# Patient Record
Sex: FEMALE | Race: BLACK OR AFRICAN AMERICAN | NOT HISPANIC OR LATINO | Employment: FULL TIME | URBAN - METROPOLITAN AREA
[De-identification: names, ages, dates, MRNs, and addresses within clinical notes are randomized per-mention and may not be internally consistent; named-entity substitution may affect disease eponyms.]

---

## 2017-01-06 ENCOUNTER — ALLSCRIPTS OFFICE VISIT (OUTPATIENT)
Dept: OTHER | Facility: OTHER | Age: 52
End: 2017-01-06

## 2017-01-09 ENCOUNTER — ALLSCRIPTS OFFICE VISIT (OUTPATIENT)
Dept: OTHER | Facility: OTHER | Age: 52
End: 2017-01-09

## 2017-03-22 ENCOUNTER — GENERIC CONVERSION - ENCOUNTER (OUTPATIENT)
Dept: OTHER | Facility: OTHER | Age: 52
End: 2017-03-22

## 2017-03-24 ENCOUNTER — ALLSCRIPTS OFFICE VISIT (OUTPATIENT)
Dept: OTHER | Facility: OTHER | Age: 52
End: 2017-03-24

## 2017-03-24 DIAGNOSIS — I10 ESSENTIAL (PRIMARY) HYPERTENSION: ICD-10-CM

## 2017-03-24 DIAGNOSIS — M54.9 DORSALGIA: ICD-10-CM

## 2017-03-24 DIAGNOSIS — Z82.3 FAMILY HISTORY OF STROKE: ICD-10-CM

## 2017-03-31 ENCOUNTER — LAB CONVERSION - ENCOUNTER (OUTPATIENT)
Dept: OTHER | Facility: OTHER | Age: 52
End: 2017-03-31

## 2017-03-31 ENCOUNTER — ALLSCRIPTS OFFICE VISIT (OUTPATIENT)
Dept: OTHER | Facility: OTHER | Age: 52
End: 2017-03-31

## 2017-03-31 LAB
AMPHETAMINES (HISTORICAL): NORMAL
BARBITUATES (HISTORICAL): NEGATIVE
BENZODIAZEPINES (HISTORICAL): POSITIVE
COCAINE (METABOLITE) (HISTORICAL): NEGATIVE
CONTACT: (HISTORICAL): NORMAL
Lab: NEGATIVE
Lab: NEGATIVE
METHADONE (HISTORICAL): NEGATIVE
METHAQUALONE (HISTORICAL): NEGATIVE
OPIATES (HISTORICAL): NEGATIVE
PLEASE NOTE: (HISTORICAL): ABNORMAL
PROPOXYPHENE (HISTORICAL): NEGATIVE
TEST INFORMATION (HISTORICAL): NORMAL
TEST NAME (HISTORICAL): NORMAL

## 2017-04-10 ENCOUNTER — HOSPITAL ENCOUNTER (OUTPATIENT)
Dept: NON INVASIVE DIAGNOSTICS | Facility: HOSPITAL | Age: 52
Discharge: HOME | End: 2017-04-10
Payer: COMMERCIAL

## 2017-04-10 ENCOUNTER — HOSPITAL ENCOUNTER (OUTPATIENT)
Dept: NUCLEAR MEDICINE | Facility: HOSPITAL | Age: 52
Discharge: HOME | End: 2017-04-10
Payer: COMMERCIAL

## 2017-04-10 DIAGNOSIS — M54.9 DORSALGIA: ICD-10-CM

## 2017-04-10 DIAGNOSIS — Z82.3 FAMILY HISTORY OF STROKE: ICD-10-CM

## 2017-04-10 DIAGNOSIS — I10 ESSENTIAL (PRIMARY) HYPERTENSION: ICD-10-CM

## 2017-04-10 PROCEDURE — 78452 HT MUSCLE IMAGE SPECT MULT: CPT

## 2017-04-10 PROCEDURE — 93017 CV STRESS TEST TRACING ONLY: CPT

## 2017-04-10 PROCEDURE — A9502 TC99M TETROFOSMIN: HCPCS

## 2017-04-10 RX ADMIN — REGADENOSON 0.4 MG: 0.08 INJECTION, SOLUTION INTRAVENOUS at 10:12

## 2017-05-08 ENCOUNTER — ALLSCRIPTS OFFICE VISIT (OUTPATIENT)
Dept: OTHER | Facility: OTHER | Age: 52
End: 2017-05-08

## 2018-01-12 VITALS
HEART RATE: 98 BPM | HEIGHT: 62 IN | WEIGHT: 202.5 LBS | DIASTOLIC BLOOD PRESSURE: 90 MMHG | BODY MASS INDEX: 37.26 KG/M2 | OXYGEN SATURATION: 98 % | SYSTOLIC BLOOD PRESSURE: 134 MMHG

## 2018-01-12 VITALS
HEIGHT: 62 IN | OXYGEN SATURATION: 98 % | WEIGHT: 200.5 LBS | BODY MASS INDEX: 36.9 KG/M2 | HEART RATE: 80 BPM | SYSTOLIC BLOOD PRESSURE: 150 MMHG | DIASTOLIC BLOOD PRESSURE: 100 MMHG

## 2018-01-14 VITALS
WEIGHT: 204.13 LBS | BODY MASS INDEX: 37.56 KG/M2 | DIASTOLIC BLOOD PRESSURE: 100 MMHG | HEART RATE: 66 BPM | SYSTOLIC BLOOD PRESSURE: 144 MMHG | OXYGEN SATURATION: 98 % | HEIGHT: 62 IN

## 2018-01-15 VITALS
TEMPERATURE: 98 F | SYSTOLIC BLOOD PRESSURE: 130 MMHG | DIASTOLIC BLOOD PRESSURE: 100 MMHG | HEART RATE: 77 BPM | BODY MASS INDEX: 37.26 KG/M2 | OXYGEN SATURATION: 97 % | HEIGHT: 62 IN | WEIGHT: 202.5 LBS

## 2020-04-01 ENCOUNTER — TELEPHONE (OUTPATIENT)
Dept: FAMILY MEDICINE CLINIC | Facility: CLINIC | Age: 55
End: 2020-04-01

## 2020-04-06 ENCOUNTER — TELEPHONE (OUTPATIENT)
Dept: OTHER | Facility: OTHER | Age: 55
End: 2020-04-06

## 2020-04-06 ENCOUNTER — NURSE TRIAGE (OUTPATIENT)
Dept: OTHER | Facility: OTHER | Age: 55
End: 2020-04-06

## 2020-04-06 DIAGNOSIS — Z20.828 EXPOSURE TO SARS-ASSOCIATED CORONAVIRUS: Primary | ICD-10-CM

## 2020-04-07 ENCOUNTER — TELEPHONE (OUTPATIENT)
Dept: FAMILY MEDICINE CLINIC | Facility: CLINIC | Age: 55
End: 2020-04-07

## 2020-04-07 DIAGNOSIS — Z20.828 EXPOSURE TO SARS-ASSOCIATED CORONAVIRUS: ICD-10-CM

## 2020-04-07 PROCEDURE — 87635 SARS-COV-2 COVID-19 AMP PRB: CPT

## 2020-04-08 ENCOUNTER — TELEPHONE (OUTPATIENT)
Dept: INTERNAL MEDICINE CLINIC | Facility: CLINIC | Age: 55
End: 2020-04-08

## 2020-04-08 LAB — SARS-COV-2 RNA SPEC QL NAA+PROBE: NOT DETECTED

## 2020-04-09 ENCOUNTER — TELEMEDICINE (OUTPATIENT)
Dept: FAMILY MEDICINE CLINIC | Facility: CLINIC | Age: 55
End: 2020-04-09
Payer: COMMERCIAL

## 2020-04-09 ENCOUNTER — TELEPHONE (OUTPATIENT)
Dept: FAMILY MEDICINE CLINIC | Facility: CLINIC | Age: 55
End: 2020-04-09

## 2020-04-09 DIAGNOSIS — J45.40 MODERATE PERSISTENT ASTHMA WITHOUT COMPLICATION: ICD-10-CM

## 2020-04-09 DIAGNOSIS — J30.2 SEASONAL ALLERGIES: ICD-10-CM

## 2020-04-09 DIAGNOSIS — I10 HYPERTENSION, UNCONTROLLED: Primary | ICD-10-CM

## 2020-04-09 PROCEDURE — 99214 OFFICE O/P EST MOD 30 MIN: CPT | Performed by: INTERNAL MEDICINE

## 2020-04-09 RX ORDER — ALBUTEROL SULFATE 90 UG/1
2 AEROSOL, METERED RESPIRATORY (INHALATION) EVERY 6 HOURS PRN
Qty: 1 INHALER | Refills: 5 | Status: SHIPPED | OUTPATIENT
Start: 2020-04-09

## 2020-04-09 RX ORDER — BUDESONIDE AND FORMOTEROL FUMARATE DIHYDRATE 160; 4.5 UG/1; UG/1
2 AEROSOL RESPIRATORY (INHALATION) 2 TIMES DAILY
Qty: 1 INHALER | Refills: 1 | Status: SHIPPED | OUTPATIENT
Start: 2020-04-09

## 2020-04-09 RX ORDER — LORATADINE 10 MG/1
10 TABLET ORAL DAILY
Qty: 90 TABLET | Refills: 1 | Status: SHIPPED | OUTPATIENT
Start: 2020-04-09

## 2020-04-09 RX ORDER — LOSARTAN POTASSIUM 100 MG/1
100 TABLET ORAL DAILY
Qty: 30 TABLET | Refills: 3 | Status: SHIPPED | OUTPATIENT
Start: 2020-04-09

## 2020-04-13 ENCOUNTER — TELEMEDICINE (OUTPATIENT)
Dept: FAMILY MEDICINE CLINIC | Facility: CLINIC | Age: 55
End: 2020-04-13
Payer: COMMERCIAL

## 2020-04-13 DIAGNOSIS — J45.40 MODERATE PERSISTENT ASTHMA WITHOUT COMPLICATION: ICD-10-CM

## 2020-04-13 DIAGNOSIS — Z13.1 SCREENING FOR DIABETES MELLITUS: ICD-10-CM

## 2020-04-13 DIAGNOSIS — Z13.21 ENCOUNTER FOR VITAMIN DEFICIENCY SCREENING: ICD-10-CM

## 2020-04-13 DIAGNOSIS — J30.2 SEASONAL ALLERGIES: ICD-10-CM

## 2020-04-13 DIAGNOSIS — Z13.29 SCREENING FOR THYROID DISORDER: ICD-10-CM

## 2020-04-13 DIAGNOSIS — Z13.220 NEED FOR LIPID SCREENING: ICD-10-CM

## 2020-04-13 DIAGNOSIS — Z13.89 SCREENING FOR GENITOURINARY CONDITION: ICD-10-CM

## 2020-04-13 DIAGNOSIS — I10 HYPERTENSION, UNCONTROLLED: ICD-10-CM

## 2020-04-13 PROCEDURE — 99213 OFFICE O/P EST LOW 20 MIN: CPT | Performed by: INTERNAL MEDICINE

## 2020-04-13 RX ORDER — HYDROCHLOROTHIAZIDE 25 MG/1
25 TABLET ORAL DAILY
Qty: 90 TABLET | Refills: 1 | Status: SHIPPED | OUTPATIENT
Start: 2020-04-13 | End: 2020-10-06

## 2020-04-14 ENCOUNTER — APPOINTMENT (OUTPATIENT)
Dept: LAB | Facility: MEDICAL CENTER | Age: 55
End: 2020-04-14
Payer: COMMERCIAL

## 2020-04-14 LAB
25(OH)D3 SERPL-MCNC: 19.6 NG/ML (ref 30–100)
ALBUMIN SERPL BCP-MCNC: 3.4 G/DL (ref 3.5–5)
ALP SERPL-CCNC: 82 U/L (ref 46–116)
ALT SERPL W P-5'-P-CCNC: 19 U/L (ref 12–78)
ANION GAP SERPL CALCULATED.3IONS-SCNC: 4 MMOL/L (ref 4–13)
AST SERPL W P-5'-P-CCNC: 8 U/L (ref 5–45)
BACTERIA UR QL AUTO: ABNORMAL /HPF
BASOPHILS # BLD AUTO: 0.05 THOUSANDS/ΜL (ref 0–0.1)
BASOPHILS NFR BLD AUTO: 1 % (ref 0–1)
BILIRUB SERPL-MCNC: 0.61 MG/DL (ref 0.2–1)
BILIRUB UR QL STRIP: NEGATIVE
BUN SERPL-MCNC: 12 MG/DL (ref 5–25)
CALCIUM SERPL-MCNC: 8.8 MG/DL (ref 8.3–10.1)
CHLORIDE SERPL-SCNC: 108 MMOL/L (ref 100–108)
CHOLEST SERPL-MCNC: 187 MG/DL (ref 50–200)
CLARITY UR: ABNORMAL
CO2 SERPL-SCNC: 30 MMOL/L (ref 21–32)
COLOR UR: YELLOW
CREAT SERPL-MCNC: 0.89 MG/DL (ref 0.6–1.3)
EOSINOPHIL # BLD AUTO: 0.11 THOUSAND/ΜL (ref 0–0.61)
EOSINOPHIL NFR BLD AUTO: 2 % (ref 0–6)
ERYTHROCYTE [DISTWIDTH] IN BLOOD BY AUTOMATED COUNT: 12.5 % (ref 11.6–15.1)
GFR SERPL CREATININE-BSD FRML MDRD: 84 ML/MIN/1.73SQ M
GLUCOSE P FAST SERPL-MCNC: 87 MG/DL (ref 65–99)
GLUCOSE UR STRIP-MCNC: NEGATIVE MG/DL
HCT VFR BLD AUTO: 43.2 % (ref 34.8–46.1)
HDLC SERPL-MCNC: 40 MG/DL
HGB BLD-MCNC: 13.7 G/DL (ref 11.5–15.4)
HGB UR QL STRIP.AUTO: NEGATIVE
IMM GRANULOCYTES # BLD AUTO: 0.01 THOUSAND/UL (ref 0–0.2)
IMM GRANULOCYTES NFR BLD AUTO: 0 % (ref 0–2)
KETONES UR STRIP-MCNC: NEGATIVE MG/DL
LDLC SERPL CALC-MCNC: 127 MG/DL (ref 0–100)
LEUKOCYTE ESTERASE UR QL STRIP: ABNORMAL
LYMPHOCYTES # BLD AUTO: 1.45 THOUSANDS/ΜL (ref 0.6–4.47)
LYMPHOCYTES NFR BLD AUTO: 28 % (ref 14–44)
MCH RBC QN AUTO: 28 PG (ref 26.8–34.3)
MCHC RBC AUTO-ENTMCNC: 31.7 G/DL (ref 31.4–37.4)
MCV RBC AUTO: 88 FL (ref 82–98)
MONOCYTES # BLD AUTO: 0.38 THOUSAND/ΜL (ref 0.17–1.22)
MONOCYTES NFR BLD AUTO: 7 % (ref 4–12)
NEUTROPHILS # BLD AUTO: 3.27 THOUSANDS/ΜL (ref 1.85–7.62)
NEUTS SEG NFR BLD AUTO: 62 % (ref 43–75)
NITRITE UR QL STRIP: NEGATIVE
NON-SQ EPI CELLS URNS QL MICRO: ABNORMAL /HPF
NONHDLC SERPL-MCNC: 147 MG/DL
NRBC BLD AUTO-RTO: 0 /100 WBCS
PH UR STRIP.AUTO: 5.5 [PH]
PLATELET # BLD AUTO: 257 THOUSANDS/UL (ref 149–390)
PMV BLD AUTO: 10 FL (ref 8.9–12.7)
POTASSIUM SERPL-SCNC: 3.6 MMOL/L (ref 3.5–5.3)
PROT SERPL-MCNC: 7.9 G/DL (ref 6.4–8.2)
PROT UR STRIP-MCNC: NEGATIVE MG/DL
RBC # BLD AUTO: 4.89 MILLION/UL (ref 3.81–5.12)
RBC #/AREA URNS AUTO: ABNORMAL /HPF
SODIUM SERPL-SCNC: 142 MMOL/L (ref 136–145)
SP GR UR STRIP.AUTO: 1.02 (ref 1–1.03)
TRIGL SERPL-MCNC: 99 MG/DL
TSH SERPL DL<=0.05 MIU/L-ACNC: 2.87 UIU/ML (ref 0.36–3.74)
UROBILINOGEN UR QL STRIP.AUTO: 0.2 E.U./DL
WBC # BLD AUTO: 5.27 THOUSAND/UL (ref 4.31–10.16)
WBC #/AREA URNS AUTO: ABNORMAL /HPF

## 2020-04-14 PROCEDURE — 80061 LIPID PANEL: CPT | Performed by: INTERNAL MEDICINE

## 2020-04-14 PROCEDURE — 80053 COMPREHEN METABOLIC PANEL: CPT | Performed by: INTERNAL MEDICINE

## 2020-04-14 PROCEDURE — 82306 VITAMIN D 25 HYDROXY: CPT | Performed by: INTERNAL MEDICINE

## 2020-04-14 PROCEDURE — 36415 COLL VENOUS BLD VENIPUNCTURE: CPT | Performed by: INTERNAL MEDICINE

## 2020-04-14 PROCEDURE — 84443 ASSAY THYROID STIM HORMONE: CPT | Performed by: INTERNAL MEDICINE

## 2020-04-14 PROCEDURE — 85025 COMPLETE CBC W/AUTO DIFF WBC: CPT | Performed by: INTERNAL MEDICINE

## 2020-04-14 PROCEDURE — 81001 URINALYSIS AUTO W/SCOPE: CPT | Performed by: INTERNAL MEDICINE

## 2020-05-21 ENCOUNTER — TELEPHONE (OUTPATIENT)
Dept: FAMILY MEDICINE CLINIC | Facility: CLINIC | Age: 55
End: 2020-05-21

## 2020-10-06 DIAGNOSIS — I10 HYPERTENSION, UNCONTROLLED: ICD-10-CM

## 2020-10-06 RX ORDER — HYDROCHLOROTHIAZIDE 25 MG/1
TABLET ORAL
Qty: 90 TABLET | Refills: 1 | Status: SHIPPED | OUTPATIENT
Start: 2020-10-06

## 2021-06-18 ENCOUNTER — VBI (OUTPATIENT)
Dept: ADMINISTRATIVE | Facility: OTHER | Age: 56
End: 2021-06-18

## 2021-12-21 ENCOUNTER — OFFICE VISIT (OUTPATIENT)
Dept: URGENT CARE | Age: 56
End: 2021-12-21
Payer: COMMERCIAL

## 2021-12-21 VITALS
SYSTOLIC BLOOD PRESSURE: 138 MMHG | DIASTOLIC BLOOD PRESSURE: 78 MMHG | RESPIRATION RATE: 18 BRPM | HEART RATE: 64 BPM | TEMPERATURE: 99.2 F | OXYGEN SATURATION: 96 %

## 2021-12-21 DIAGNOSIS — Z20.822 ENCOUNTER FOR LABORATORY TESTING FOR COVID-19 VIRUS: ICD-10-CM

## 2021-12-21 DIAGNOSIS — R10.9 ABDOMINAL PAIN, UNSPECIFIED ABDOMINAL LOCATION: ICD-10-CM

## 2021-12-21 DIAGNOSIS — B34.9 VIRAL SYNDROME: Primary | ICD-10-CM

## 2021-12-21 DIAGNOSIS — N39.0 URINARY TRACT INFECTION WITHOUT HEMATURIA, SITE UNSPECIFIED: ICD-10-CM

## 2021-12-21 LAB
SL AMB  POCT GLUCOSE, UA: NORMAL
SL AMB LEUKOCYTE ESTERASE,UA: NORMAL
SL AMB POCT BILIRUBIN,UA: NORMAL
SL AMB POCT BLOOD,UA: NORMAL
SL AMB POCT CLARITY,UA: CLEAR
SL AMB POCT COLOR,UA: NORMAL
SL AMB POCT KETONES,UA: NORMAL
SL AMB POCT NITRITE,UA: NORMAL
SL AMB POCT PH,UA: 5
SL AMB POCT SPECIFIC GRAVITY,UA: 1.03
SL AMB POCT URINE PROTEIN: 30
SL AMB POCT UROBILINOGEN: 0.2

## 2021-12-21 PROCEDURE — 87636 SARSCOV2 & INF A&B AMP PRB: CPT | Performed by: PHYSICIAN ASSISTANT

## 2021-12-21 PROCEDURE — 87086 URINE CULTURE/COLONY COUNT: CPT | Performed by: PHYSICIAN ASSISTANT

## 2021-12-21 PROCEDURE — 99213 OFFICE O/P EST LOW 20 MIN: CPT | Performed by: PHYSICIAN ASSISTANT

## 2021-12-21 PROCEDURE — 87147 CULTURE TYPE IMMUNOLOGIC: CPT | Performed by: PHYSICIAN ASSISTANT

## 2021-12-21 RX ORDER — AMLODIPINE BESYLATE 10 MG/1
TABLET ORAL
COMMUNITY
Start: 2021-12-11

## 2021-12-21 RX ORDER — CEPHALEXIN 500 MG/1
500 CAPSULE ORAL EVERY 8 HOURS SCHEDULED
Qty: 30 CAPSULE | Refills: 0 | Status: SHIPPED | OUTPATIENT
Start: 2021-12-21 | End: 2021-12-31

## 2021-12-23 LAB — BACTERIA UR CULT: NORMAL

## 2021-12-24 LAB
FLUAV RNA RESP QL NAA+PROBE: NEGATIVE
FLUBV RNA RESP QL NAA+PROBE: NEGATIVE
SARS-COV-2 RNA RESP QL NAA+PROBE: NEGATIVE

## 2022-02-28 ENCOUNTER — OFFICE VISIT (OUTPATIENT)
Dept: URGENT CARE | Age: 57
End: 2022-02-28
Payer: COMMERCIAL

## 2022-02-28 VITALS
TEMPERATURE: 96.6 F | OXYGEN SATURATION: 98 % | HEART RATE: 58 BPM | BODY MASS INDEX: 39.23 KG/M2 | SYSTOLIC BLOOD PRESSURE: 158 MMHG | DIASTOLIC BLOOD PRESSURE: 88 MMHG | RESPIRATION RATE: 20 BRPM | WEIGHT: 213.2 LBS | HEIGHT: 62 IN

## 2022-02-28 DIAGNOSIS — S61.210A LACERATION OF RIGHT INDEX FINGER WITHOUT FOREIGN BODY WITHOUT DAMAGE TO NAIL, INITIAL ENCOUNTER: Primary | ICD-10-CM

## 2022-02-28 PROCEDURE — 12002 RPR S/N/AX/GEN/TRNK2.6-7.5CM: CPT | Performed by: PHYSICIAN ASSISTANT

## 2022-02-28 PROCEDURE — 99213 OFFICE O/P EST LOW 20 MIN: CPT | Performed by: PHYSICIAN ASSISTANT

## 2022-02-28 RX ORDER — CEPHALEXIN 500 MG/1
500 CAPSULE ORAL EVERY 12 HOURS SCHEDULED
Qty: 14 CAPSULE | Refills: 0 | Status: SHIPPED | OUTPATIENT
Start: 2022-02-28 | End: 2022-03-07

## 2022-02-28 NOTE — PROGRESS NOTES
[facility]        NAME: Millie Mobley is a 60 y o  female  : 1965    MRN: [id number]  DATE: 2022  TIME: 2:06 PM    Assessment and Plan   Laceration of right index finger without foreign body without damage to nail, initial encounter [S61 210A]  1  Laceration of right index finger without foreign body without damage to nail, initial encounter  cephalexin (KEFLEX) 500 mg capsule   Patient presents with a laceration on the right index finger that requires repair as it is over an interphalangeal joint which is a high tension area  Laceration repaired as outlined below  Patient will be started on Keflex for infection prophylaxis  She is placed in a finger splint which she instructed to wear for the next 3-4 days and then she may remove it  We discussed wound care  We discussed signs of infection including erythema worsening pain drainage and inability to move the finger  Inability move finger would also be a sign of possible tendon damage  Patient should reports emergency room if she develops signs of infection or evidence of tendon damage  Patient will otherwise follow-up in the clinic in approximately 10 days for suture removal       Patient Instructions     Patient Instructions     Your laceration was repaired today  Leave dressing in place x 24 hours  You may need to soak your dressing in a saline solution or warm soapy water for a few minutes if stuck to the sutures to remove it  Keep wound clean and dry  Wash gently with soap and water as needed, do not submerge or soak as this will delay healing and increase risk of infection  Take antibiotics as directed until complete  Use over the counter ibuprofen and/or tylenol as directed on the bottle as needed for pain  Return to clinic in 7-10 days for suture removal or sooner if signs of infection develop (erythema, worsening pain, or thick white or yellow drainage)       Laceration   AMBULATORY CARE:   A laceration is an injury to the skin and the soft tissue underneath it  Lacerations can happen anywhere on the body  Common symptoms include the following:   · Injury or wound to skin and tissue of any shape size that looks like a cut, tear, or gash    · Edges of the wound may be close together or wide apart    · Pain, bleeding, bruising, or swelling    · Numbness around the wound    · Decreased movement in an area below the wound    Seek care immediately if:   · You have heavy bleeding or bleeding that does not stop after 10 minutes of holding firm, direct pressure over the wound  · Your wound opens up  Call your doctor if:   · You have a fever or chills  · Your laceration is red, warm, or swollen  · You have red streaks on your skin coming from your wound  · You have white or yellow drainage from the wound that smells bad  · You have pain that gets worse, even after treatment  · You have questions or concerns about your condition or care  Treatment for a laceration  includes care to stop any bleeding  Your healthcare provider will stop the bleeding by applying pressure to the wound  He or she may need to check your wound for foreign objects and clean it to decrease the chance of infection  Your laceration may be closed with stitches, staples, tissue glue, or medical strips  Ask your healthcare provider if you need a tetanus shot  Medicines: You may need any of the following:  · Prescription pain medicine  may be given  Ask your healthcare provider how to take this medicine safely  Some prescription pain medicines contain acetaminophen  Do not take other medicines that contain acetaminophen without talking to your healthcare provider  Too much acetaminophen may cause liver damage  Prescription pain medicine may cause constipation  Ask your healthcare provider how to prevent or treat constipation  · Antibiotics  help treat or prevent a bacterial infection  · Take your medicine as directed  Contact your healthcare provider if you think your medicine is not helping or if you have side effects  Tell him or her if you are allergic to any medicine  Keep a list of the medicines, vitamins, and herbs you take  Include the amounts, and when and why you take them  Bring the list or the pill bottles to follow-up visits  Carry your medicine list with you in case of an emergency  Care for your wound as directed:   · Do not get your wound wet  until your healthcare provider says it is okay  Do not soak your wound in water  Do not go swimming until your healthcare provider says it is okay  Carefully wash the wound with soap and water  Gently pat the area dry or allow it to air dry  · Change your bandages  when they get wet, dirty, or after washing  Apply new, clean bandages as directed  Do not apply elastic bandages or tape too tight  Do not put powders or lotions over your incision  · Apply antibiotic ointment as directed  Your healthcare provider may give you antibiotic ointment to put over your wound if you have stitches  If you have strips of tape over your incision, let them dry up and fall off on their own  If they do not fall off within 14 days, gently remove them  If you have glue over your wound, do not remove or pick at it  If your glue comes off, do not replace it with glue that you have at home  · Check your wound every day for signs of infection, such as swelling, redness, or pus  Self-care:   · Apply ice  on your wound for 15 to 20 minutes every hour or as directed  Use an ice pack, or put crushed ice in a plastic bag  Cover it with a towel  Ice helps prevent tissue damage and decreases swelling and pain  · Use a splint as directed  A splint will decrease movement and stress on your wound  It may help it heal faster  A splint may be used for lacerations over joints or areas of your body that bend  Ask your healthcare provider how to apply and remove a splint      · Decrease scarring of your wound  by applying ointments as directed  Do not apply ointments until your healthcare provider says it is okay  You may need to wait until your wound is healed  Ask which ointment to buy and how often to use it  After your wound is healed, use sunscreen over the area when you are out in the sun  You should do this for at least 6 months to 1 year after your injury  Follow up with your doctor as directed: You will need to return in 3 to 14 days to have stitches or staples removed  Write down your questions so you remember to ask them during your visits  © Copyright [address] Information is for End User's use only and may not be sold, redistributed or otherwise used for commercial purposes  All illustrations and images included in CareNotes® are the copyrighted property of A D A M , Inc  or [facility] Steffanie Chaves   The above information is an  only  It is not intended as medical advice for individual conditions or treatments  Talk to your doctor, nurse or pharmacist before following any medical regimen to see if it is safe and effective for you  Follow up with PCP in 3-5 days  Proceed to  ER if symptoms worsen  Chief Complaint     Chief Complaint   Patient presents with    Laceration     Pt was using a kitchen knife at home approx 30 minutes ago and cut her right third digit  Pt denies blood thinners  Last Tdap 4/2021  History of Present Illness       60year old female presents with laceration to the right index finger  Pt reports she was using a serrated knife and cut her finger approximately 30 minutes prior to check-in at our clinic  She is right handed  Pt denies numbness and reports she can use the digit normally, but the wound is very deep and she is concerned she may have damaged her tendon  She is not on any blood thinning medications  Pt reports that her last TDap was 04/2021      Review of Systems   Review of Systems   Skin: Positive for wound  Current Medications       Current Outpatient Medications:     albuterol (PROVENTIL HFA,VENTOLIN HFA) 90 mcg/act inhaler, Inhale 2 puffs every 6 (six) hours as needed for wheezing or shortness of breath (Patient not taking: Reported on 2/28/2022 ), Disp: 1 Inhaler, Rfl: 5    amLODIPine (NORVASC) 10 mg tablet, , Disp: , Rfl:     budesonide-formoterol (SYMBICORT) 160-4 5 mcg/act inhaler, Inhale 2 puffs 2 (two) times a day Rinse mouth after use  (Patient not taking: Reported on 2/28/2022 ), Disp: 1 Inhaler, Rfl: 1    cephalexin (KEFLEX) 500 mg capsule, Take 1 capsule (500 mg total) by mouth every 12 (twelve) hours for 7 days, Disp: 14 capsule, Rfl: 0    hydrochlorothiazide (HYDRODIURIL) 25 mg tablet, take 1 tablet by mouth once daily (Patient not taking: Reported on 2/28/2022), Disp: 90 tablet, Rfl: 1    loratadine (CLARITIN) 10 mg tablet, Take 1 tablet (10 mg total) by mouth daily (Patient not taking: Reported on 2/28/2022 ), Disp: 90 tablet, Rfl: 1    losartan (COZAAR) 100 MG tablet, Take 1 tablet (100 mg total) by mouth daily (Patient not taking: Reported on 2/28/2022 ), Disp: 30 tablet, Rfl: 3    Current Allergies     Allergies as of 02/28/2022    (No Known Allergies)            The following portions of the patient's history were reviewed and updated as appropriate: allergies, current medications, past family history, past medical history, past social history, past surgical history and problem list      Past Medical History:   Diagnosis Date    Asthma        History reviewed  No pertinent surgical history  History reviewed  No pertinent family history  Medications have been verified  Objective   /88   Pulse 58   Temp (!) 96 6 °F (35 9 °C)   Resp 20   Ht 5' 2" (1 575 m)   Wt 96 7 kg (213 lb 3 2 oz)   SpO2 98%   BMI 38 99 kg/m²   No LMP recorded  Patient is postmenopausal        Physical Exam     Physical Exam  Vitals and nursing note reviewed     Constitutional: General: She is awake  She is not in acute distress  Appearance: Normal appearance  She is well-developed and well-groomed  She is not ill-appearing, toxic-appearing or diaphoretic  HENT:      Head: Normocephalic and atraumatic  Right Ear: Hearing and external ear normal       Left Ear: Hearing and external ear normal    Eyes:      General: Lids are normal  Vision grossly intact  Gaze aligned appropriately  Extraocular Movements: Extraocular movements intact  Cardiovascular:      Rate and Rhythm: Normal rate  Pulmonary:      Effort: Pulmonary effort is normal       Comments: Patient is speaking in full sentences with no increased respiratory effort  No audible wheezing or stridor  Musculoskeletal:      Cervical back: Normal range of motion  Comments: Pt presents with 3 cm laceration over the right Index Finger crossing the PIP joint  Pt has full tendon function to all digits (FDS, FDP, and extensor function) with 5/5 strength  Sensation is grossly intact to radial, ulnar, and medial nerve distributions  Capillary refill is < 2 seconds to all digits  No differences when compared to the contralateral side  Skin:     General: Skin is warm and dry  Neurological:      Mental Status: She is alert and oriented to person, place, and time  Coordination: Coordination is intact  Gait: Gait is intact  Psychiatric:         Attention and Perception: Attention and perception normal          Mood and Affect: Mood and affect normal          Speech: Speech normal          Behavior: Behavior normal  Behavior is cooperative  Laceration repair    Date/Time: 2/28/2022 2:03 PM  Performed by: Devika Crow PA-C  Authorized by: Devika Crow PA-C   Consent: Verbal consent obtained  Written consent not obtained    Risks and benefits: risks, benefits and alternatives were discussed  Consent given by: patient  Patient understanding: patient states understanding of the procedure being performed  Patient identity confirmed: verbally with patient  Time out: Immediately prior to procedure a "time out" was called to verify the correct patient, procedure, equipment, support staff and site/side marked as required  Body area: upper extremity  Location details: right index finger  Laceration length: 3 cm  Foreign bodies: no foreign bodies  Tendon involvement: none  Nerve involvement: none  Vascular damage: no  Anesthesia: local infiltration    Anesthesia:  Local Anesthetic: lidocaine 2% without epinephrine  Anesthetic total: 1 mL    Sedation:  Patient sedated: no      Wound Dehiscence:  Superficial Wound Dehiscence: simple closure      Procedure Details:  Preparation: Patient was prepped and draped in the usual sterile fashion  Irrigation solution: saline  Irrigation method: jet lavage  Amount of cleaning: standard  Debridement: none  Degree of undermining: none  Skin closure: 4-0 nylon  Number of sutures: 4  Technique: simple  Approximation: close  Approximation difficulty: simple  Dressing: Xeroform, non-stick pad, 1 inch gauze, finger splint, 2 inch coban  Patient tolerance: patient tolerated the procedure well with no immediate complications                      Note: Portions of this record may have been created with voice recognition software  Occasional wrong word or "sound a like" substitutions may have occurred due to the inherent limitations of voice recognition software  Please read the chart carefully and recognize, using context, where substitutions have occurred  *

## 2022-03-14 ENCOUNTER — OFFICE VISIT (OUTPATIENT)
Dept: URGENT CARE | Age: 57
End: 2022-03-14

## 2022-03-14 VITALS
WEIGHT: 213 LBS | TEMPERATURE: 97.8 F | SYSTOLIC BLOOD PRESSURE: 164 MMHG | HEART RATE: 67 BPM | DIASTOLIC BLOOD PRESSURE: 80 MMHG | BODY MASS INDEX: 38.96 KG/M2 | RESPIRATION RATE: 18 BRPM | OXYGEN SATURATION: 95 %

## 2022-03-14 DIAGNOSIS — Z48.02 ENCOUNTER FOR REMOVAL OF SUTURES: Primary | ICD-10-CM

## 2022-03-14 DIAGNOSIS — S61.210A LACERATION OF RIGHT INDEX FINGER WITHOUT FOREIGN BODY WITHOUT DAMAGE TO NAIL, INITIAL ENCOUNTER: ICD-10-CM

## 2022-03-14 NOTE — LETTER
March 14, 2022     Patient: Nish Arnett   YOB: 1965   Date of Visit: 3/14/2022       To Whom It May Concern: It is my medical opinion that Debbie Hay may return to work on 03/15/2022  If you have any questions or concerns, please don't hesitate to call           Sincerely,        Devika Crow PA-C    CC: No Recipients

## 2022-03-14 NOTE — PROGRESS NOTES
[facility]        NAME: Igor Rene is a 60 y o  female  : 1965    MRN: [id number]  DATE: 2022  TIME: 7:28 PM    Assessment and Plan   Encounter for removal of sutures [Z48 02]  1  Encounter for removal of sutures     2  Laceration of right index finger without foreign body without damage to nail, initial encounter     Pt presents for suture removal  Sutures removed as outlined below  Discussed signs of infection and when to return or report to the ED  Patient Instructions     Patient Instructions   Watch for signs of infection and if they develop return or report to the emergency department  Steristrips   WHAT YOU NEED TO KNOW:   Steristrips are sterile pieces of medical tape used to close wounds and help the edges grow back together  Steristrips keep the wound clean and protected while it heals  Steristrips usually fall off on their own in about 7 to 10 days  DISCHARGE INSTRUCTIONS:   Return to the emergency department if:   · You have a fever  · You see red streaks on your skin starting at the wound  · Your wound has a foul smell or is draining fluid or pus  · Your wound is red, swollen, and warm to the touch  · Your wound opens or comes apart  Contact your healthcare provider if:   · The skin under and around the steristrips itches or is not comfortable  · You have questions or concerns about your condition or care  How to use steristrips:  Always wash your hands before you care for your wound  This will help prevent infection  Clean and dry the skin around your wound before you put on new steristrips  · Care for the wound as directed  Do not bathe for 24 hours  After 24 hours, wash around the area gently as directed  Pat the area dry with a clean towel, or let it air dry  Do not rub the area with a towel to dry it  Check the wound for signs of infection, such as swelling or pus  · Only remove the steristrips if directed    Your healthcare provider may want you to remove the steristrips after a certain number of days  Do not remove them early, even if they are causing itching or discomfort  If you are directed to remove the steristrips, pull gently  You might cause the wound to open if you pull hard  Hold the skin down as you slowly and gently remove the strips  · Apply new steristrips as directed  Start at the middle of the wound  Gently bring the edges of the wound together and place the middle of the steristrip on the wound  Do not stretch the steristrip  Smooth the ends of the steristrip down onto your skin  Add more steristrips as needed for the rest of the wound  Leave small gaps between strips so you do not completely cover the wound  Fluid from the wound may build under the strips if you completely cover it  The fluid may make the strips peel  Your healthcare provider may recommend that you add steristrips down the ends of the pieces you placed across the wound  This will help keep the steristrips in place as you move  · Do not scrub or pick at the steristrips  This can cause your wound to open  Trim the edges of the strips if they start to curl  Then press the ends firmly onto your skin  Follow up with your doctor as directed:  Write down your questions so you remember to ask them during your visits  © Copyright [employer] 2022 Information is for End User's use only and may not be sold, redistributed or otherwise used for commercial purposes  All illustrations and images included in CareNotes® are the copyrighted property of [employer] M [employer] Inc  or Tyler Chaves   The above information is an  only  It is not intended as medical advice for individual conditions or treatments  Talk to your doctor, nurse or pharmacist before following any medical regimen to see if it is safe and effective for you  Follow up with PCP in 3-5 days  Proceed to  ER if symptoms worsen      Chief Complaint     Chief Complaint   Patient presents with    Suture / Staple Removal     pt here for removal of sutures in right pointer finger         History of Present Illness       53-year-old female presents for       Suture / Staple Removal  Treated in ED: 02/28/2022 (14 days ago) She tried oral antibiotics and regular soap and water washings since the wound repair  The treatment provided significant relief  Maximum temperature: denies fever  There has been no drainage from the wound  There is no redness present  There is no swelling present  There is no pain present  She has no difficulty moving the affected extremity or digit  Review of Systems   Review of Systems   Constitutional: Negative for chills and fever  Musculoskeletal: Negative for joint swelling and myalgias  Current Medications       Current Outpatient Medications:     albuterol (PROVENTIL HFA,VENTOLIN HFA) 90 mcg/act inhaler, Inhale 2 puffs every 6 (six) hours as needed for wheezing or shortness of breath (Patient not taking: Reported on 2/28/2022 ), Disp: 1 Inhaler, Rfl: 5    amLODIPine (NORVASC) 10 mg tablet, , Disp: , Rfl:     budesonide-formoterol (SYMBICORT) 160-4 5 mcg/act inhaler, Inhale 2 puffs 2 (two) times a day Rinse mouth after use   (Patient not taking: Reported on 2/28/2022 ), Disp: 1 Inhaler, Rfl: 1    hydrochlorothiazide (HYDRODIURIL) 25 mg tablet, take 1 tablet by mouth once daily (Patient not taking: Reported on 2/28/2022), Disp: 90 tablet, Rfl: 1    loratadine (CLARITIN) 10 mg tablet, Take 1 tablet (10 mg total) by mouth daily (Patient not taking: Reported on 2/28/2022 ), Disp: 90 tablet, Rfl: 1    losartan (COZAAR) 100 MG tablet, Take 1 tablet (100 mg total) by mouth daily (Patient not taking: Reported on 2/28/2022 ), Disp: 30 tablet, Rfl: 3    Current Allergies     Allergies as of 03/14/2022    (No Known Allergies)            The following portions of the patient's history were reviewed and updated as appropriate: allergies, current medications, past family history, past medical history, past social history, past surgical history and problem list      Past Medical History:   Diagnosis Date    Asthma        History reviewed  No pertinent surgical history  History reviewed  No pertinent family history  Medications have been verified  Objective   /80   Pulse 67   Temp 97 8 °F (36 6 °C)   Resp 18   Wt 96 6 kg (213 lb)   SpO2 95%   BMI 38 96 kg/m²   No LMP recorded  Patient is postmenopausal        Physical Exam     Physical Exam  Vitals and nursing note reviewed  Constitutional:       General: She is awake  She is not in acute distress  Appearance: Normal appearance  She is well-developed and well-groomed  She is not ill-appearing, toxic-appearing or diaphoretic  HENT:      Head: Normocephalic and atraumatic  Right Ear: Hearing and external ear normal       Left Ear: Hearing and external ear normal    Eyes:      General: Lids are normal  Vision grossly intact  Gaze aligned appropriately  Extraocular Movements: Extraocular movements intact  Cardiovascular:      Rate and Rhythm: Normal rate  Pulmonary:      Effort: Pulmonary effort is normal       Comments: Patient is speaking in full sentences with no increased respiratory effort  No audible wheezing or stridor  Musculoskeletal:      Cervical back: Normal range of motion  Comments: 3 cm laceration to the right index finger, healing well  No signs of infection  Pt has full tendon function to all digits (FDS, FDP, and extensor function) with 5/5 strength  Sensation is grossly intact to radial, ulnar, and medial nerve distributions  Capillary refill is < 2 seconds to all digits  No differences when compared to the contralateral side  Skin:     General: Skin is warm and dry  Neurological:      Mental Status: She is alert and oriented to person, place, and time  Coordination: Coordination is intact        Gait: Gait is intact  Psychiatric:         Attention and Perception: Attention and perception normal          Mood and Affect: Mood and affect normal          Speech: Speech normal          Behavior: Behavior normal  Behavior is cooperative  Suture removal    Date/Time: 3/14/2022 7:24 PM  Performed by: Rody Stephen PA-C  Authorized by: Rody Stephen PA-C   Universal Protocol:  Consent: Verbal consent obtained  Written consent not obtained  Risks and benefits: risks, benefits and alternatives were discussed  Consent given by: patient  Time out: Immediately prior to procedure a "time out" was called to verify the correct patient, procedure, equipment, support staff and site/side marked as required  Patient understanding: patient states understanding of the procedure being performed  Patient identity confirmed: verbally with patient        Patient location:  Clinic  Location:     Laterality:  Right    Location:  Upper extremity    Upper extremity location:  Hand    Hand location:  R index finger  Procedure details: Tools used:  Suture removal kit    Wound appearance:  No sign(s) of infection and clean    Number of sutures removed:  4  Post-procedure details:     Post-removal:  Steri-Strips applied    Patient tolerance of procedure: Tolerated well, no immediate complications  Comments:      Superficial dehiscence noted common with this type of injury  Discussed with patient that superficial avulsed area will likely necrose over the coming weeks  Deep layers are well-healed with no dehiscence with flexion and extension of the digit  Note: Portions of this record may have been created with voice recognition software  Occasional wrong word or "sound a like" substitutions may have occurred due to the inherent limitations of voice recognition software  Please read the chart carefully and recognize, using context, where substitutions have occurred  *

## 2022-03-14 NOTE — PATIENT INSTRUCTIONS
Watch for signs of infection and if they develop return or report to the emergency department  Steristrips   WHAT YOU NEED TO KNOW:   Steristrips are sterile pieces of medical tape used to close wounds and help the edges grow back together  Steristrips keep the wound clean and protected while it heals  Steristrips usually fall off on their own in about 7 to 10 days  DISCHARGE INSTRUCTIONS:   Return to the emergency department if:   · You have a fever  · You see red streaks on your skin starting at the wound  · Your wound has a foul smell or is draining fluid or pus  · Your wound is red, swollen, and warm to the touch  · Your wound opens or comes apart  Contact your healthcare provider if:   · The skin under and around the steristrips itches or is not comfortable  · You have questions or concerns about your condition or care  How to use steristrips:  Always wash your hands before you care for your wound  This will help prevent infection  Clean and dry the skin around your wound before you put on new steristrips  · Care for the wound as directed  Do not bathe for 24 hours  After 24 hours, wash around the area gently as directed  Pat the area dry with a clean towel, or let it air dry  Do not rub the area with a towel to dry it  Check the wound for signs of infection, such as swelling or pus  · Only remove the steristrips if directed  Your healthcare provider may want you to remove the steristrips after a certain number of days  Do not remove them early, even if they are causing itching or discomfort  If you are directed to remove the steristrips, pull gently  You might cause the wound to open if you pull hard  Hold the skin down as you slowly and gently remove the strips  · Apply new steristrips as directed  Start at the middle of the wound  Gently bring the edges of the wound together and place the middle of the steristrip on the wound  Do not stretch the steristrip   Smooth the ends of the steristrip down onto your skin  Add more steristrips as needed for the rest of the wound  Leave small gaps between strips so you do not completely cover the wound  Fluid from the wound may build under the strips if you completely cover it  The fluid may make the strips peel  Your healthcare provider may recommend that you add steristrips down the ends of the pieces you placed across the wound  This will help keep the steristrips in place as you move  · Do not scrub or pick at the steristrips  This can cause your wound to open  Trim the edges of the strips if they start to curl  Then press the ends firmly onto your skin  Follow up with your doctor as directed:  Write down your questions so you remember to ask them during your visits  © Copyright [employer] 2022 Information is for End User's use only and may not be sold, redistributed or otherwise used for commercial purposes  All illustrations and images included in CareNotes® are the copyrighted property of [employer]  or [facility] Steffanie Chaves   The above information is an  only  It is not intended as medical advice for individual conditions or treatments  Talk to your doctor, nurse or pharmacist before following any medical regimen to see if it is safe and effective for you